# Patient Record
Sex: FEMALE | Race: BLACK OR AFRICAN AMERICAN | NOT HISPANIC OR LATINO | Employment: FULL TIME | URBAN - METROPOLITAN AREA
[De-identification: names, ages, dates, MRNs, and addresses within clinical notes are randomized per-mention and may not be internally consistent; named-entity substitution may affect disease eponyms.]

---

## 2024-01-30 ENCOUNTER — SOCIAL WORK (OUTPATIENT)
Dept: ADMINISTRATIVE | Facility: OTHER | Age: 49
End: 2024-01-30

## 2024-01-30 PROBLEM — N39.46 MIXED STRESS AND URGE URINARY INCONTINENCE: Status: ACTIVE | Noted: 2024-01-30

## 2024-01-30 PROBLEM — D50.0 CHRONIC BLOOD LOSS ANEMIA: Status: ACTIVE | Noted: 2024-01-30

## 2024-01-30 PROBLEM — N92.0 MENORRHAGIA WITH REGULAR CYCLE: Status: ACTIVE | Noted: 2024-01-30

## 2024-01-30 NOTE — PROGRESS NOTES
SW received a consult for insurance coverage problems. Pt states that she has recently learned that her Medicaid benefits are inactive. SW provided contact information for financial counselor to further assist with reapplying for Medicaid and/or Freecare. Pt verbalized understanding. No other needs were mentioned at this time.     ROBYN Singh    [phone] (phone)  [phone] (fax)